# Patient Record
Sex: FEMALE | Race: BLACK OR AFRICAN AMERICAN | NOT HISPANIC OR LATINO | Employment: STUDENT | URBAN - METROPOLITAN AREA
[De-identification: names, ages, dates, MRNs, and addresses within clinical notes are randomized per-mention and may not be internally consistent; named-entity substitution may affect disease eponyms.]

---

## 2024-11-16 ENCOUNTER — APPOINTMENT (OUTPATIENT)
Dept: GENERAL RADIOLOGY | Facility: HOSPITAL | Age: 29
End: 2024-11-16
Payer: MEDICAID

## 2024-11-16 ENCOUNTER — HOSPITAL ENCOUNTER (EMERGENCY)
Facility: HOSPITAL | Age: 29
Discharge: HOME | End: 2024-11-16
Attending: EMERGENCY MEDICINE
Payer: MEDICAID

## 2024-11-16 VITALS
HEIGHT: 63 IN | HEART RATE: 77 BPM | BODY MASS INDEX: 28.35 KG/M2 | RESPIRATION RATE: 18 BRPM | DIASTOLIC BLOOD PRESSURE: 81 MMHG | OXYGEN SATURATION: 99 % | WEIGHT: 160 LBS | SYSTOLIC BLOOD PRESSURE: 120 MMHG | TEMPERATURE: 98.3 F

## 2024-11-16 DIAGNOSIS — S76.012A HIP STRAIN, LEFT, INITIAL ENCOUNTER: Primary | ICD-10-CM

## 2024-11-16 PROCEDURE — 73502 X-RAY EXAM HIP UNI 2-3 VIEWS: CPT

## 2024-11-16 PROCEDURE — 99283 EMERGENCY DEPT VISIT LOW MDM: CPT

## 2024-11-16 RX ORDER — IBUPROFEN 800 MG/1
800 TABLET, FILM COATED ORAL ONCE
Status: DISCONTINUED | OUTPATIENT
Start: 2024-11-16 | End: 2024-11-16

## 2024-11-16 RX ORDER — ACETAMINOPHEN 500 MG
1000 TABLET ORAL ONCE
Status: COMPLETED | OUTPATIENT
Start: 2024-11-16 | End: 2024-11-16

## 2024-11-16 RX ORDER — METHOCARBAMOL 750 MG/1
750 TABLET, FILM COATED ORAL 3 TIMES DAILY
Qty: 21 TABLET | Refills: 0 | Status: SHIPPED | OUTPATIENT
Start: 2024-11-16 | End: 2024-11-23

## 2024-11-16 RX ORDER — KETOROLAC TROMETHAMINE 30 MG/ML
30 INJECTION, SOLUTION INTRAMUSCULAR; INTRAVENOUS ONCE
Status: DISCONTINUED | OUTPATIENT
Start: 2024-11-16 | End: 2024-11-16

## 2024-11-16 RX ORDER — DIAZEPAM 2 MG/1
2 TABLET ORAL ONCE
Status: DISCONTINUED | OUTPATIENT
Start: 2024-11-16 | End: 2024-11-16 | Stop reason: HOSPADM

## 2024-11-16 RX ADMIN — ACETAMINOPHEN 1000 MG: 500 TABLET ORAL at 05:34

## 2024-11-16 NOTE — ED PROVIDER NOTES
Subjective   History of Present Illness  This is a 29-year-old female presented to the emergency department with some left hip pain.  The patient states that she was rolling over in bed and felt some pain in the left hip.  This occurred few hours ago.  It is radiating down into her mid thigh and knee.  Is worse when she changes positions or walks.  Denies any direct trauma.  Does not have any hematuria dysuria.  No abdominal pain or vomiting.  Denies any fevers or chills.  No headache or change in vision.  No focal weakness    History provided by:  Patient   used: No        Review of Systems   Constitutional: Negative.    Respiratory: Negative.     Musculoskeletal: Negative.  Positive for arthralgias and myalgias.   Neurological: Negative.        No past medical history on file.    No Known Allergies    No past surgical history on file.    No family history on file.    Social History     Socioeconomic History    Marital status: Single           Objective   Physical Exam  Vitals and nursing note reviewed.   Constitutional:       General: She is not in acute distress.     Appearance: She is not ill-appearing or toxic-appearing.   Cardiovascular:      Pulses: Normal pulses.   Musculoskeletal:      Comments: Patient has some minor pain with flexion extension at the left hip.  There is no obvious deformity.  Pelvis is stable.  Compartments are soft.  Neurovascular intact   Neurological:      General: No focal deficit present.      Mental Status: She is alert.         Procedures           ED Course  ED Course as of 11/16/24 0553   Sat Nov 16, 2024   0455 BP: 120/81 [JK]   0455 Noninvasive MAP (mmHg): 101 [JK]   0455 Temp: 98.3 °F (36.8 °C) [JK]   0455 Temp src: Oral [JK]   0455 Heart Rate: 77 [JK]   0455 Resp: 18 [JK]   0455 SpO2: 99 %  Interpretation:  Patient's vitals were directly viewed and interpreted by myself.   O2 sat 99% on room air, interpreted as normal  Telemetry revealed a rate of 77 bpm,  interpreted as normal sinus rhythm [JK]   0552 XR Hip With or Without Pelvis 2 - 3 View Left  Interpretation:  Imaging was directly visualized by myself, per my interpretations, x-ray of the left hip is unremarkable. [JK]   0552 Patient declined Valium [JK]   0552 On reevaluation, the patient's pain is improved.  Range of motion appears intact.  Repeat examination does not show any evidence of compartment syndrome or neurovascular compromise.  Patient is given care instructions for the injury.  Extremities to be propped up and elevated.  They are to ice injury for 15 minutes, to avoid any frostbite.     [JK]   0552 I had a discussion with the patient/family regarding diagnosis, diagnostic results, treatment plan, and medications. The patient/family indicated understanding of these instructions. I spent adequate time at the bedside prior to discharge necessary to discuss the aftercare instructions, giving patient education, providing explanations of the results of our evaluations/findings, and my decision making to assure that the patient/family understand the plan of care. Time was allotted to answer questions at that time and throughout the ED course. Patient is required to maintain timely follow up, as discussed. I also discussed the potential for the development of an acute emergent condition requiring further evaluation, return to the ER, admission, or even surgical intervention.  I encouraged the patient to return to the emergency department immediately for any concerns, worsening symptoms, new complaints, or if symptoms persist and they are unable to seek follow-up in a timely fashion. The patient/family expressed understanding and agreement with this plan    Shared decision making:   After full review of the patient's clinical presentation, review of any work-up including but not limited to laboratory studies and radiology obtained, I had a discussion with the patient.  Treatment options were discussed as  well as the risks, benefits and consequences.  I discussed all findings with the patient and family members if available.  During the discussion, treatment goals were understood by all as well as any misconceptions which were addressed with the patient.  Ample time was given for any questions they may have had.  They are in agreement with the treatment plan as well as final disposition. [JK]      ED Course User Index  [JK] Girish Burnette MD                                                       Medical Decision Making  This is a 29-year-old female presented to the emergency department some left hip pain.  Fasting consistent with a strain, possibly the inguinal ligament.  Pelvis is stable.  No neurovascular compromise.  Patient provided analgesics.  Imaging obtained.      Differential diagnosis: Left hip sprain, strain, contusion, fracture, dislocation    Amount and/or Complexity of Data Reviewed  Radiology: ordered and independent interpretation performed. Decision-making details documented in ED Course.    Risk  Prescription drug management.        Final diagnoses:   Hip strain, left, initial encounter       ED Disposition  ED Disposition       ED Disposition   Discharge    Condition   Stable    Comment   --               PATIENT CONNECTION - [place] [address]  [phone]  Call in 1 day           Medication List        New Prescriptions      methocarbamol 750 MG tablet  Commonly known as: ROBAXIN  Take 1 tablet by mouth 3 (Three) Times a Day for 7 days.               Where to Get Your Medications        These medications were sent to [id number] - [place], [place] - [address] AT [facility] [address] - [phone]  - [phone] FX  [address] [address]      Phone: [phone]   methocarbamol 750 MG tablet            Girish Burnette MD  11/16/24 2031

## 2024-11-16 NOTE — Clinical Note
[facility] EMERGENCY DEPARTMENT  [address]  [facility] [address]  Phone: [phone]    Luba Frazier was seen and treated in our emergency department on 11/16/2024.  She may return to work on 11/18/2024.         Thank you for choosing [facility].    Girish Burnette MD